# Patient Record
Sex: FEMALE
[De-identification: names, ages, dates, MRNs, and addresses within clinical notes are randomized per-mention and may not be internally consistent; named-entity substitution may affect disease eponyms.]

---

## 2017-05-05 PROBLEM — Z00.00 ENCOUNTER FOR PREVENTIVE HEALTH EXAMINATION: Status: ACTIVE | Noted: 2017-05-05

## 2017-06-02 ENCOUNTER — APPOINTMENT (OUTPATIENT)
Dept: CARDIOLOGY | Facility: CLINIC | Age: 67
End: 2017-06-02

## 2018-05-14 ENCOUNTER — RECORD ABSTRACTING (OUTPATIENT)
Age: 68
End: 2018-05-14

## 2018-05-16 ENCOUNTER — APPOINTMENT (OUTPATIENT)
Dept: CARDIOLOGY | Facility: CLINIC | Age: 68
End: 2018-05-16
Payer: COMMERCIAL

## 2018-05-16 ENCOUNTER — NON-APPOINTMENT (OUTPATIENT)
Age: 68
End: 2018-05-16

## 2018-05-16 VITALS
SYSTOLIC BLOOD PRESSURE: 118 MMHG | BODY MASS INDEX: 39.2 KG/M2 | HEART RATE: 96 BPM | DIASTOLIC BLOOD PRESSURE: 78 MMHG | HEIGHT: 62 IN | WEIGHT: 213 LBS

## 2018-05-16 DIAGNOSIS — Z87.898 PERSONAL HISTORY OF OTHER SPECIFIED CONDITIONS: ICD-10-CM

## 2018-05-16 DIAGNOSIS — Z86.59 PERSONAL HISTORY OF OTHER MENTAL AND BEHAVIORAL DISORDERS: ICD-10-CM

## 2018-05-16 DIAGNOSIS — Z82.49 FAMILY HISTORY OF ISCHEMIC HEART DISEASE AND OTHER DISEASES OF THE CIRCULATORY SYSTEM: ICD-10-CM

## 2018-05-16 PROCEDURE — 99214 OFFICE O/P EST MOD 30 MIN: CPT

## 2018-05-16 PROCEDURE — 93000 ELECTROCARDIOGRAM COMPLETE: CPT

## 2018-05-16 RX ORDER — CHROMIUM 200 MCG
TABLET ORAL
Refills: 0 | Status: ACTIVE | COMMUNITY

## 2018-05-17 PROBLEM — Z87.898 HISTORY OF EDEMA: Status: RESOLVED | Noted: 2018-05-14 | Resolved: 2018-05-17

## 2018-05-17 PROBLEM — Z86.59 HISTORY OF ANXIETY: Status: RESOLVED | Noted: 2018-05-14 | Resolved: 2018-05-17

## 2018-05-17 PROBLEM — Z82.49 FAMILY HISTORY OF MYOCARDIAL INFARCTION: Status: ACTIVE | Noted: 2018-05-14

## 2018-05-17 PROBLEM — Z82.49 FAMILY HISTORY OF HYPERTENSION: Status: ACTIVE | Noted: 2018-05-14

## 2018-05-31 ENCOUNTER — APPOINTMENT (OUTPATIENT)
Dept: CARDIOLOGY | Facility: CLINIC | Age: 68
End: 2018-05-31
Payer: COMMERCIAL

## 2018-05-31 PROCEDURE — 93306 TTE W/DOPPLER COMPLETE: CPT

## 2018-06-08 ENCOUNTER — APPOINTMENT (OUTPATIENT)
Dept: CARDIOLOGY | Facility: CLINIC | Age: 68
End: 2018-06-08

## 2018-07-11 ENCOUNTER — APPOINTMENT (OUTPATIENT)
Dept: CARDIOLOGY | Facility: CLINIC | Age: 68
End: 2018-07-11
Payer: COMMERCIAL

## 2018-07-11 PROCEDURE — 93015 CV STRESS TEST SUPVJ I&R: CPT

## 2018-07-11 PROCEDURE — A9502: CPT

## 2018-07-11 PROCEDURE — 78452 HT MUSCLE IMAGE SPECT MULT: CPT

## 2018-07-12 ENCOUNTER — APPOINTMENT (OUTPATIENT)
Dept: CARDIOLOGY | Facility: CLINIC | Age: 68
End: 2018-07-12

## 2018-07-12 ENCOUNTER — RESULT REVIEW (OUTPATIENT)
Age: 68
End: 2018-07-12

## 2018-07-13 ENCOUNTER — RESULT REVIEW (OUTPATIENT)
Age: 68
End: 2018-07-13

## 2019-05-15 ENCOUNTER — NON-APPOINTMENT (OUTPATIENT)
Age: 69
End: 2019-05-15

## 2019-05-15 ENCOUNTER — APPOINTMENT (OUTPATIENT)
Dept: CARDIOLOGY | Facility: CLINIC | Age: 69
End: 2019-05-15
Payer: COMMERCIAL

## 2019-05-15 VITALS
WEIGHT: 210 LBS | BODY MASS INDEX: 38.64 KG/M2 | OXYGEN SATURATION: 98 % | HEART RATE: 72 BPM | SYSTOLIC BLOOD PRESSURE: 126 MMHG | HEIGHT: 62 IN | DIASTOLIC BLOOD PRESSURE: 76 MMHG

## 2019-05-15 PROCEDURE — 93000 ELECTROCARDIOGRAM COMPLETE: CPT

## 2019-05-15 PROCEDURE — 99214 OFFICE O/P EST MOD 30 MIN: CPT

## 2019-05-15 RX ORDER — TRIAMTERENE AND HYDROCHLOROTHIAZIDE 25; 37.5 MG/1; MG/1
37.5-25 TABLET ORAL DAILY
Qty: 90 | Refills: 3 | Status: DISCONTINUED | COMMUNITY
Start: 2018-05-16 | End: 2019-05-15

## 2019-05-24 ENCOUNTER — APPOINTMENT (OUTPATIENT)
Dept: CARDIOLOGY | Facility: CLINIC | Age: 69
End: 2019-05-24
Payer: COMMERCIAL

## 2019-05-24 VITALS
DIASTOLIC BLOOD PRESSURE: 80 MMHG | WEIGHT: 210 LBS | HEART RATE: 84 BPM | BODY MASS INDEX: 38.64 KG/M2 | SYSTOLIC BLOOD PRESSURE: 132 MMHG | HEIGHT: 62 IN | OXYGEN SATURATION: 96 %

## 2019-05-24 PROCEDURE — 99214 OFFICE O/P EST MOD 30 MIN: CPT

## 2019-05-24 RX ORDER — OMEGA-3/DHA/EPA/FISH OIL 300-1000MG
CAPSULE ORAL
Refills: 0 | Status: ACTIVE | COMMUNITY

## 2019-11-27 ENCOUNTER — MEDICATION RENEWAL (OUTPATIENT)
Age: 69
End: 2019-11-27

## 2019-12-04 ENCOUNTER — APPOINTMENT (OUTPATIENT)
Dept: CARDIOLOGY | Facility: CLINIC | Age: 69
End: 2019-12-04
Payer: COMMERCIAL

## 2019-12-04 VITALS
SYSTOLIC BLOOD PRESSURE: 128 MMHG | DIASTOLIC BLOOD PRESSURE: 70 MMHG | HEIGHT: 62 IN | HEART RATE: 81 BPM | BODY MASS INDEX: 38.46 KG/M2 | WEIGHT: 209 LBS | OXYGEN SATURATION: 94 %

## 2019-12-04 PROCEDURE — 99214 OFFICE O/P EST MOD 30 MIN: CPT

## 2019-12-04 NOTE — PHYSICAL EXAM
[General Appearance - Well Developed] : well developed [Well Groomed] : well groomed [General Appearance - Well Nourished] : well nourished [Normal Appearance] : normal appearance [General Appearance - In No Acute Distress] : no acute distress [No Deformities] : no deformities [Eyelids - No Xanthelasma] : the eyelids demonstrated no xanthelasmas [Normal Oral Mucosa] : normal oral mucosa [Normal Conjunctiva] : the conjunctiva exhibited no abnormalities [No Oral Pallor] : no oral pallor [JVD Elevated _____cm] : JVD elevated [unfilled] ~U cm above clavicle [No Oral Cyanosis] : no oral cyanosis [Respiration, Rhythm And Depth] : normal respiratory rhythm and effort [Exaggerated Use Of Accessory Muscles For Inspiration] : no accessory muscle use [Auscultation Breath Sounds / Voice Sounds] : lungs were clear to auscultation bilaterally [Heart Rate And Rhythm] : heart rate and rhythm were normal [Arterial Pulses Normal] : the arterial pulses were normal [Heart Sounds] : normal S1 and S2 [Murmurs] : no murmurs present [Bowel Sounds] : normal bowel sounds [Abnormal Walk] : normal gait [Abdomen Soft] : soft [Nail Clubbing] : no clubbing of the fingernails [Cyanosis, Localized] : no localized cyanosis [Skin Color & Pigmentation] : normal skin color and pigmentation [] : no ischemic changes [Petechial Hemorrhages (___cm)] : no petechial hemorrhages [Skin Turgor] : normal skin turgor [No Xanthoma] : no  xanthoma was observed [Oriented To Time, Place, And Person] : oriented to person, place, and time [Mood] : the mood was normal [No Anxiety] : not feeling anxious [Affect] : the affect was normal [FreeTextEntry1] : Truncal obesity

## 2019-12-04 NOTE — ASSESSMENT
[FreeTextEntry1] : LABS AND/OR TEST:\par EKG  May 16, 2018,:  sinus rhythm, poor R-wave progression, cannot rule out old anterior wall myocardial infarction.\par \par Blood work, June 20, 2017, sodium of 144, potassium of 4.6, chloride of 107, CO2 of 27, glucose of 111, creatinine of 0.44 with BUN of 17, normal transaminases.  Triglycerides of 108, HDL of 59, LDL of 129, total cholesterol of 210, hemoglobin A1c of 5.7, normal TSH.\par \par EKG 05/15/19: Sinus rhythm. No significant changes\par \par

## 2019-12-04 NOTE — HISTORY OF PRESENT ILLNESS
[FreeTextEntry1] : HPI:\par Yolie is a pleasant 69-year-old female with:\par \par Obesity.  Unable to lose any weight; continues to have problems with physical activity secondary to arthritis.  She is quite stressed.  has dementia;  she sometimes gets headaches due to stress\par \par Edema.  She continues to use triamterene and hydrochlorothiazide 37.5/25 mg, maybe two days a week.\par \par Dyslipidemia.  Not following dietary restrictions. She also had mild fasting hyperglycemia in may last year\par \par Cardiovascular review of systems is negative for palpitations, presyncope, syncope, or angina.  She denies orthopnea, CNS events, or claudication at the level of exercise performed.\par

## 2019-12-04 NOTE — DISCUSSION/SUMMARY
[FreeTextEntry1] : ASSESSMENT AND RECOMMENDATIONS:\par A 69-year-old  female with past medical history as detailed above with the following active issues:\par \par Edema.  Multifactorial including intermittent use of nonsteroidals such as Celebrex, obesity, arthritis of both knees with resultant edema following her effusions, possible varicosities.  \par \par Echocardiogram Was done in May 2018. Borderline LVEF at 50%. Pulmonary hypertension was not reported.\par \par Obesity.  I have reviewed with her the importance of weight loss and improvement of her quality of life including her arthritis.\par \par I have given her a lab slip to check a CMP, A1c, fasting lipids. She will call for results\par \par \par \par Sincerely,\par Gael Rangel MD, FACC, [place]LE \par \par

## 2019-12-04 NOTE — PHYSICAL EXAM
[General Appearance - Well Developed] : well developed [Normal Appearance] : normal appearance [Well Groomed] : well groomed [General Appearance - Well Nourished] : well nourished [General Appearance - In No Acute Distress] : no acute distress [No Deformities] : no deformities [Normal Conjunctiva] : the conjunctiva exhibited no abnormalities [Normal Oral Mucosa] : normal oral mucosa [Eyelids - No Xanthelasma] : the eyelids demonstrated no xanthelasmas [No Oral Pallor] : no oral pallor [No Oral Cyanosis] : no oral cyanosis [JVD Elevated _____cm] : JVD elevated [unfilled] ~U cm above clavicle [Respiration, Rhythm And Depth] : normal respiratory rhythm and effort [Auscultation Breath Sounds / Voice Sounds] : lungs were clear to auscultation bilaterally [Exaggerated Use Of Accessory Muscles For Inspiration] : no accessory muscle use [Heart Rate And Rhythm] : heart rate and rhythm were normal [Heart Sounds] : normal S1 and S2 [Murmurs] : no murmurs present [Arterial Pulses Normal] : the arterial pulses were normal [Bowel Sounds] : normal bowel sounds [Abdomen Soft] : soft [Abnormal Walk] : normal gait [Nail Clubbing] : no clubbing of the fingernails [Petechial Hemorrhages (___cm)] : no petechial hemorrhages [Cyanosis, Localized] : no localized cyanosis [] : no ischemic changes [Skin Color & Pigmentation] : normal skin color and pigmentation [Skin Turgor] : normal skin turgor [No Xanthoma] : no  xanthoma was observed [Oriented To Time, Place, And Person] : oriented to person, place, and time [Affect] : the affect was normal [No Anxiety] : not feeling anxious [Mood] : the mood was normal [FreeTextEntry1] : No kyphosis

## 2019-12-04 NOTE — DISCUSSION/SUMMARY
[FreeTextEntry1] : ASSESSMENT AND RECOMMENDATIONS:\par A 69-year-old  female with past medical history as detailed above with the following active issues:\par \par Edema.  Multifactorial including intermittent use of nonsteroidals such as Celebrex, obesity, arthritis of both knees with resultant edema following her effusions, possible varicosities.  \par \par Echocardiogram Was done in May 2018. Borderline LVEF at 50%. Pulmonary hypertension was not reported.\par \par Obesity.  I have reviewed with her the importance of weight loss and improvement of her quality of life including her arthritis.\par \par Labs showed mild hyperglycemia, normal creatinine and LFT. LDL of 124. He was A1C 5.7\par \par Dietary changes, exercise program was discussed in detail. 10 yr calculated risk is greater than 10%. Start statins. Check labs after one month.\par \par \par \par Sincerely,\par Gael Rangel MD, FACC, [place]LE \par \par

## 2019-12-04 NOTE — HISTORY OF PRESENT ILLNESS
[FreeTextEntry1] : HPI:\par I had the pleasure of seeing Ms. Whelan for her annual cardiovascular follow up.  Since she was last seen, there have been no hospitalizations.\par \par Obesity.  Unable to lose any weight as she is unable to control her appetite and continues to have problems with physical activity secondary to arthritis.  She is quite stressed.  has dementia;  she sometimes gets headaches due to stress\par \par Edema.  She continues to use triamterene and hydrochlorothiazide 37.5/25 mg, maybe two days a week.\par \par Dyslipidemia.  Not following dietary restrictions. She also had mild fasting hyperglycemia in may last year\par \par Cardiovascular review of systems is negative for palpitations, presyncope, syncope, or angina.  She denies orthopnea, CNS events, or claudication at the level of exercise performed.\par

## 2019-12-04 NOTE — PHYSICAL EXAM
[General Appearance - Well Developed] : well developed [Normal Appearance] : normal appearance [Well Groomed] : well groomed [General Appearance - Well Nourished] : well nourished [No Deformities] : no deformities [General Appearance - In No Acute Distress] : no acute distress [Normal Conjunctiva] : the conjunctiva exhibited no abnormalities [Eyelids - No Xanthelasma] : the eyelids demonstrated no xanthelasmas [Normal Oral Mucosa] : normal oral mucosa [No Oral Pallor] : no oral pallor [No Oral Cyanosis] : no oral cyanosis [JVD Elevated _____cm] : JVD elevated [unfilled] ~U cm above clavicle [Respiration, Rhythm And Depth] : normal respiratory rhythm and effort [Exaggerated Use Of Accessory Muscles For Inspiration] : no accessory muscle use [Auscultation Breath Sounds / Voice Sounds] : lungs were clear to auscultation bilaterally [Heart Rate And Rhythm] : heart rate and rhythm were normal [Heart Sounds] : normal S1 and S2 [Murmurs] : no murmurs present [Arterial Pulses Normal] : the arterial pulses were normal [Bowel Sounds] : normal bowel sounds [Abdomen Soft] : soft [Abnormal Walk] : normal gait [Nail Clubbing] : no clubbing of the fingernails [Cyanosis, Localized] : no localized cyanosis [Petechial Hemorrhages (___cm)] : no petechial hemorrhages [] : no ischemic changes [Skin Turgor] : normal skin turgor [Skin Color & Pigmentation] : normal skin color and pigmentation [No Xanthoma] : no  xanthoma was observed [Affect] : the affect was normal [Oriented To Time, Place, And Person] : oriented to person, place, and time [No Anxiety] : not feeling anxious [Mood] : the mood was normal [FreeTextEntry1] : No kyphosis

## 2019-12-04 NOTE — DISCUSSION/SUMMARY
[FreeTextEntry1] : ASSESSMENT AND RECOMMENDATIONS:\par A 69-year-old  female with past medical history as detailed above with the following active issues:\par \par Edema.  Multifactorial including intermittent use of nonsteroidals such as Celebrex, obesity, arthritis of both knees with resultant edema following her effusions, possible varicosities.  \par \par Echocardiogram Was done in May 2018. Borderline LVEF at 50%. Pulmonary hypertension was not reported.\par \par Obesity.  I have reviewed with her the importance of weight loss and improvement of her quality of life including her arthritis.\par \par Labs showed mild hyperglycemia, normal creatinine and LFT. LDL of 124. He was A1C 5.7\par \par Dietary changes, exercise program was discussed in detail. 10 yr calculated risk is greater than 10%. Start statins.  She Lost her lab slip.  Repeat labs were given to her.\par \par Patient had abnormal echocardiogram in 2018 with inferior wall hypokinesis.  Subsequent nuclear stress test showed normal SPECT perfusion imaging in July 2018\par \par Sincerely,\par Gael Rangel MD, FACC, [place]HELLE \par \par

## 2021-12-25 ENCOUNTER — TRANSCRIPTION ENCOUNTER (OUTPATIENT)
Age: 71
End: 2021-12-25

## 2022-03-16 ENCOUNTER — RESULT CHARGE (OUTPATIENT)
Age: 72
End: 2022-03-16

## 2022-03-17 ENCOUNTER — APPOINTMENT (OUTPATIENT)
Dept: CARDIOLOGY | Facility: CLINIC | Age: 72
End: 2022-03-17
Payer: COMMERCIAL

## 2022-03-17 ENCOUNTER — NON-APPOINTMENT (OUTPATIENT)
Age: 72
End: 2022-03-17

## 2022-03-17 VITALS
OXYGEN SATURATION: 95 % | HEIGHT: 62 IN | BODY MASS INDEX: 39.56 KG/M2 | DIASTOLIC BLOOD PRESSURE: 78 MMHG | SYSTOLIC BLOOD PRESSURE: 118 MMHG | HEART RATE: 86 BPM | WEIGHT: 215 LBS | TEMPERATURE: 97.3 F

## 2022-03-17 DIAGNOSIS — Z86.79 PERSONAL HISTORY OF OTHER DISEASES OF THE CIRCULATORY SYSTEM: ICD-10-CM

## 2022-03-17 PROCEDURE — 99214 OFFICE O/P EST MOD 30 MIN: CPT

## 2022-03-17 RX ORDER — ATORVASTATIN CALCIUM 20 MG/1
20 TABLET, FILM COATED ORAL
Qty: 90 | Refills: 3 | Status: DISCONTINUED | COMMUNITY
Start: 2019-05-24 | End: 2022-03-17

## 2022-03-17 RX ORDER — COLD-HOT PACK
EACH MISCELLANEOUS
Refills: 0 | Status: ACTIVE | COMMUNITY

## 2022-03-17 RX ORDER — BENZONATATE 200 MG/1
200 CAPSULE ORAL
Qty: 30 | Refills: 0 | Status: DISCONTINUED | COMMUNITY
Start: 2019-11-18 | End: 2022-03-17

## 2022-03-17 NOTE — PHYSICAL EXAM
[General Appearance - Well Developed] : well developed [Normal Appearance] : normal appearance [Well Groomed] : well groomed [General Appearance - Well Nourished] : well nourished [No Deformities] : no deformities [General Appearance - In No Acute Distress] : no acute distress [Normal Conjunctiva] : the conjunctiva exhibited no abnormalities [Eyelids - No Xanthelasma] : the eyelids demonstrated no xanthelasmas [Normal Oral Mucosa] : normal oral mucosa [No Oral Pallor] : no oral pallor [No Oral Cyanosis] : no oral cyanosis [JVD Elevated _____cm] : JVD elevated [unfilled] ~U cm above clavicle [Respiration, Rhythm And Depth] : normal respiratory rhythm and effort [Exaggerated Use Of Accessory Muscles For Inspiration] : no accessory muscle use [Auscultation Breath Sounds / Voice Sounds] : lungs were clear to auscultation bilaterally [Heart Rate And Rhythm] : heart rate and rhythm were normal [Heart Sounds] : normal S1 and S2 [Murmurs] : no murmurs present [Arterial Pulses Normal] : the arterial pulses were normal [Bowel Sounds] : normal bowel sounds [Abdomen Soft] : soft [Abnormal Walk] : normal gait [Nail Clubbing] : no clubbing of the fingernails [Cyanosis, Localized] : no localized cyanosis [Petechial Hemorrhages (___cm)] : no petechial hemorrhages [] : no ischemic changes [Skin Color & Pigmentation] : normal skin color and pigmentation [Skin Turgor] : normal skin turgor [No Xanthoma] : no  xanthoma was observed [Oriented To Time, Place, And Person] : oriented to person, place, and time [Affect] : the affect was normal [Mood] : the mood was normal [No Anxiety] : not feeling anxious [FreeTextEntry1] : No kyphosis

## 2022-03-17 NOTE — HISTORY OF PRESENT ILLNESS
[FreeTextEntry1] : HPI:\par Yolie is a pleasant 72-year-old female with:\par \par Obesity.  Unable to lose any weight; continues to have problems with physical activity secondary to arthritis.  She is quite stressed.  has dementia;  \par \par Edema.  She continues to use triamterene and hydrochlorothiazide 37.5/25 mg, maybe two days a week.\par \par Dyslipidemia.  Not following dietary restrictions. She also had fasting hyperglycemia in December last year\par \par Cardiovascular review of systems is negative for palpitations, presyncope, syncope, or angina.  She denies orthopnea, CNS events, or claudication at the level of exercise performed.\par \par Status post breast surgery for cancer in 2021.  Subsequent radiation treatment and currently on suppressive therapy p.o.\par

## 2022-03-17 NOTE — ASSESSMENT
[FreeTextEntry1] : LABS AND/OR TEST:\par EKG  May 16, 2018,:  sinus rhythm, poor R-wave progression, cannot rule out old anterior wall myocardial infarction.\par \par Blood work, June 20, 2017, sodium of 144, potassium of 4.6, chloride of 107, CO2 of 27, glucose of 111, creatinine of 0.44 with BUN of 17, normal transaminases.  Triglycerides of 108, HDL of 59, LDL of 129, total cholesterol of 210, hemoglobin A1c of 5.7, normal TSH.\par \par EKG 05/15/19: Sinus rhythm. No significant changes\par \par EKG 3/2022: NSR, wnl\par \par EKG 3/17/2022: Sinus rhythm, unremarkable\par \par Labs December 2021: Normal CBC except neutrophil percentage 79.9.  Potassium 3.2.  .  Normal LFT\par \par

## 2022-03-17 NOTE — DISCUSSION/SUMMARY
[FreeTextEntry1] : ASSESSMENT AND RECOMMENDATIONS:\par A 72-year-old  female with past medical history as detailed above with the following active issues:\par \par Edema.  Multifactorial including intermittent use of nonsteroidals such as Celebrex, obesity, arthritis of both knees and ankles and possible varicosities.  Mostly subcutaneous fat however.\par \par Echocardiogram Was done in May 2018. Borderline LVEF at 50%. Pulmonary hypertension was not reported.  She does have dyspnea on exertion.  This could be multifactorial but echo should be repeated.\par \par Unequal carotid upstrokes and risk factors for atherosclerosis: Check carotid ultrasound.\par \par Obesity.  I have reviewed with her the importance of weight loss and improvement of her quality of life including her arthritis.\par \par December labs showed  hyperglycemia, normal creatinine and low potassium.  Also normal LFT. LDL of 124 in the past. He was A1C 5.7; check BMP, A1c, fasting lipids.  She most likely will be be a candidate of statins.\par \par Dietary changes, exercise program was discussed in detail. 10 yr calculated risk was greater than 10% and she was started on statins but she has discontinued them.  She does not remember.\par \par Patient had abnormal echocardiogram in 2018 with inferior wall hypokinesis.  Subsequent nuclear stress test showed normal SPECT perfusion imaging in July 2018\par \par Sincerely,\par Gael Rangel MD, FACC, [place] \par \par

## 2022-04-13 ENCOUNTER — APPOINTMENT (OUTPATIENT)
Dept: CARDIOLOGY | Facility: CLINIC | Age: 72
End: 2022-04-13
Payer: COMMERCIAL

## 2022-04-13 PROCEDURE — 93880 EXTRACRANIAL BILAT STUDY: CPT

## 2022-04-13 PROCEDURE — 93306 TTE W/DOPPLER COMPLETE: CPT

## 2022-04-14 ENCOUNTER — APPOINTMENT (OUTPATIENT)
Dept: CARDIOLOGY | Facility: CLINIC | Age: 72
End: 2022-04-14
Payer: COMMERCIAL

## 2022-04-14 VITALS
BODY MASS INDEX: 39.56 KG/M2 | OXYGEN SATURATION: 93 % | HEIGHT: 62 IN | HEART RATE: 78 BPM | DIASTOLIC BLOOD PRESSURE: 64 MMHG | TEMPERATURE: 97.3 F | SYSTOLIC BLOOD PRESSURE: 120 MMHG | WEIGHT: 215 LBS

## 2022-04-14 PROCEDURE — 99214 OFFICE O/P EST MOD 30 MIN: CPT

## 2022-04-14 NOTE — ASSESSMENT
[FreeTextEntry1] : LABS AND/OR TEST:\par EKG  May 16, 2018,:  sinus rhythm, poor R-wave progression, cannot rule out old anterior wall myocardial infarction.\par \par Blood work, June 20, 2017, sodium of 144, potassium of 4.6, chloride of 107, CO2 of 27, glucose of 111, creatinine of 0.44 with BUN of 17, normal transaminases.  Triglycerides of 108, HDL of 59, LDL of 129, total cholesterol of 210, hemoglobin A1c of 5.7, normal TSH.\par \par EKG 05/15/19: Sinus rhythm. No significant changes\par \par EKG 3/2022: NSR, wnl\par \par EKG 3/17/2022: Sinus rhythm, unremarkable\par \par Labs December 2021: Normal CBC except neutrophil percentage 79.9.  Potassium 3.2.  .  Normal LFT\par \par Carotid ultrasound April 2022: No significant stenosis.  Thyroid nodule, incidental finding noted.\par \par Labs April 22: See above\par \par Echocardiogram April 2022: Borderline LVEF.  Unchanged compared to previous echocardiograms.  Mild LVH.  Normal PASP\par \par

## 2022-04-14 NOTE — DISCUSSION/SUMMARY
[FreeTextEntry1] : ASSESSMENT AND RECOMMENDATIONS:\par A 72-year-old  female with following active issues:\par \par Mild lower extremity edema.  Multifactorial including intermittent use of nonsteroidals such as Celebrex, obesity, arthritis of both knees and ankles and possible varicosities.  Mostly subcutaneous fat however.\par \par Echocardiogram Was done in May 2018. Borderline LVEF at 50%. Pulmonary hypertension was not reported.  She does have dyspnea on exertion.  This could be multifactorial but echo was repeated.  EF remains borderline.  Start low-dose metoprolol.  If the blood pressure allows, we will add ARB.  Rule out CAD -noninvasive Lexiscan stress test was discussed.  Patient agreeable.  Risk, benefits and limitations discussed.\par \par Carotid ultrasound does not show any significant stenosis .  Incidental thyroid nodule.  I have given the patient a copy of the report which she will take to Dr. Metzger for follow-up thyroid ultrasound and possible biopsy\par \par Obesity.  I have reviewed with her the importance of weight loss and improvement of her quality of life including her arthritis.\par \par December labs showed  hyperglycemia, normal creatinine and low potassium.  Also normal LFT. LDL of 124 in the past.  A1C 5.7; start statins.  She used to take Lipitor 10 mg daily.\par \par Dietary changes, exercise program was discussed in detail. 10 yr calculated risk was greater than 10% and she was started on statins but she has discontinued them.  She does not remember.\par \par Patient had abnormal echocardiogram in 2018 as well as 2022.  Baseline EKG has bifascicular block.  She has occasional epigastric discomfort several risk factors including hyperglycemia.  Further evaluation with Lexiscan stress test should be performed.\par \par Follow-up after stress test\par \par \par Sincerely,\par Gael Rangel MD, FACC, [place] \par \par

## 2022-04-14 NOTE — HISTORY OF PRESENT ILLNESS
[FreeTextEntry1] : HPI:\par Yolie is a pleasant 72-year-old female with:\par \par Obesity.  Unable to lose any weight; continues to have problems with physical activity secondary to arthritis.  She is quite stressed.  has dementia;  \par \par Edema.  It is quite slight.  She continues to use triamterene and hydrochlorothiazide 37.5/25 mg, maybe two days a week.\par \par Dyslipidemia.  Not following dietary restrictions. She also had fasting hyperglycemia in December last year.  She used to take statins which she has now stopped.  Her LDL is 125.  Total cholesterol 210 on labs April 2022\par \par Mild fasting hyperglycemia.   on labs in April 2022\par \par Cardiovascular review of systems is negative for palpitations, presyncope, syncope, or angina.  She denies orthopnea, CNS events, or claudication at the level of exercise performed.\par \par Status post breast surgery for cancer in 2021.  Subsequent radiation treatment and currently on suppressive therapy p.o.\par

## 2022-04-26 ENCOUNTER — APPOINTMENT (OUTPATIENT)
Dept: CARDIOLOGY | Facility: CLINIC | Age: 72
End: 2022-04-26
Payer: COMMERCIAL

## 2022-04-26 PROCEDURE — 93015 CV STRESS TEST SUPVJ I&R: CPT

## 2022-04-26 PROCEDURE — 78452 HT MUSCLE IMAGE SPECT MULT: CPT

## 2022-04-26 PROCEDURE — A9502: CPT

## 2022-05-12 ENCOUNTER — APPOINTMENT (OUTPATIENT)
Dept: CARDIOLOGY | Facility: CLINIC | Age: 72
End: 2022-05-12
Payer: COMMERCIAL

## 2022-05-12 VITALS
TEMPERATURE: 97.8 F | DIASTOLIC BLOOD PRESSURE: 76 MMHG | BODY MASS INDEX: 39.93 KG/M2 | SYSTOLIC BLOOD PRESSURE: 134 MMHG | OXYGEN SATURATION: 97 % | HEART RATE: 55 BPM | WEIGHT: 217 LBS | RESPIRATION RATE: 14 BRPM | HEIGHT: 62 IN

## 2022-05-12 DIAGNOSIS — R60.0 LOCALIZED EDEMA: ICD-10-CM

## 2022-05-12 PROCEDURE — 99214 OFFICE O/P EST MOD 30 MIN: CPT

## 2022-05-12 NOTE — ASSESSMENT
[FreeTextEntry1] : LABS AND/OR TEST:\par EKG  May 16, 2018,:  sinus rhythm, poor R-wave progression, cannot rule out old anterior wall myocardial infarction.\par \par EKG 3/2022: NSR, wnl\par \par EKG 3/17/2022: Sinus rhythm, unremarkable\par \par Labs December 2021: Normal CBC except neutrophil percentage 79.9.  Potassium 3.2.  .  Normal LFT\par \par Carotid ultrasound April 2022: No significant stenosis.  Thyroid nodule, incidental finding noted.\par \par Labs April 22: See above\par \par Echocardiogram April 2022: Borderline LVEF.  Unchanged compared to previous echocardiograms.  Mild LVH.  Normal PASP\par \par Lexiscan stress test with SPECT April 2022: Small anterior scar versus attenuation.  No ischemia.\par \par

## 2022-05-12 NOTE — DISCUSSION/SUMMARY
[FreeTextEntry1] : ASSESSMENT AND RECOMMENDATIONS:\par A 72-year-old  female with following active issues:\par \par Mild lower extremity edema.  Multifactorial including intermittent use of non-steroidals such as Celebrex, obesity, arthritis of both knees and ankles and possible varicosities.  Mostly subcutaneous fat however.  And some varicose veins\par \par Echocardiogram Was done in May 2018. Borderline LVEF at 50%. Pulmonary hypertension was not reported.  She does have dyspnea on exertion.  This could be multifactorial but echo was repeated in April 2022.  EF remains borderline.  \par \par Patient had nuclear stress test in April 2022 which showed fixed defect, small and mild probably attenuation from breast.  No ischemia.\par \par Hypertension: Started low-dose metoprolol.  Her pressure improved.  At her blood pressure less than 130/80.  Patient understands this.  Nonpharmacologic ways of reducing blood pressure discussed.\par \par Carotid ultrasound does not show any significant stenosis .  Incidental thyroid nodule.  I have given the patient a copy of the report which she will take to Dr. Metzger for follow-up thyroid ultrasound and possible biopsy\par \par Obesity.  I have reviewed with her the importance of weight loss and improvement of her quality of life including her arthritis.\par \par December 2021 labs showed  hyperglycemia, normal creatinine and low potassium.  Also normal LFT. LDL of 124 in the past.  A1C 5.7; start statins.  She used to take Lipitor 10 mg daily.\par \par Dietary changes, exercise program was discussed in detail. 10 yr calculated risk was greater than 10% and she was started on statins but she has discontinued them.  She is agreeable to resume them.  They have been prescribed to the pharmacy.\par \par Follow-up in 6 months\par \par \par Sincerely,\par Gael Rangel MD, FACC, [place]HELLE \par \par

## 2022-12-08 ENCOUNTER — APPOINTMENT (OUTPATIENT)
Dept: CARDIOLOGY | Facility: CLINIC | Age: 72
End: 2022-12-08

## 2022-12-15 ENCOUNTER — NON-APPOINTMENT (OUTPATIENT)
Age: 72
End: 2022-12-15

## 2022-12-15 ENCOUNTER — APPOINTMENT (OUTPATIENT)
Dept: CARDIOLOGY | Facility: CLINIC | Age: 72
End: 2022-12-15

## 2022-12-15 VITALS
DIASTOLIC BLOOD PRESSURE: 72 MMHG | HEIGHT: 62 IN | HEART RATE: 77 BPM | SYSTOLIC BLOOD PRESSURE: 150 MMHG | BODY MASS INDEX: 39.93 KG/M2 | WEIGHT: 217 LBS | TEMPERATURE: 97.7 F | OXYGEN SATURATION: 96 %

## 2022-12-15 PROCEDURE — 99214 OFFICE O/P EST MOD 30 MIN: CPT

## 2022-12-15 RX ORDER — METOPROLOL SUCCINATE 25 MG/1
25 TABLET, EXTENDED RELEASE ORAL
Qty: 90 | Refills: 1 | Status: DISCONTINUED | COMMUNITY
Start: 2022-04-14 | End: 2022-12-15

## 2022-12-15 NOTE — DISCUSSION/SUMMARY
[FreeTextEntry1] : ASSESSMENT AND RECOMMENDATIONS:\par A 72-year-old  female with following active issues:\par \par Mild lower extremity edema.  Multifactorial including intermittent use of non-steroidals such as Celebrex, obesity, arthritis of both knees and ankles and possible varicosities.  Mostly subcutaneous fat however.  And some varicose veins\par \par Echocardiogram Was done in May 2018. Borderline LVEF at 50%. Pulmonary hypertension was not reported.  She does have dyspnea on exertion.  Echo was repeated in April 2022.  EF remains borderline.  \par \par Patient had nuclear stress test in April 2022 which showed fixed defect, small and mild probably attenuation from breast.  No ischemia.\par \par Hypertension: She was started low-dose metoprolol.  Her pressure improved.  She has stopped taking metoprolol on her own.  Pressure is elevated today.  Nonpharmacologic ways of reducing blood pressure discussed.  She feels that her pressures are well controlled at other physician offices and at home.\par \par Carotid ultrasound does not show any significant stenosis .  Incidental thyroid nodule.  I have minded her regarding follow-up for her thyroid nodule\par \par Obesity.  I have reviewed with her the importance of weight loss and improvement of her quality of life including her arthritis, improvement in blood pressure control and lipid control and sugar control.  She wants to try Wegovy..\par \par December 2021 labs showed  hyperglycemia, normal creatinine and low potassium.  Also normal LFT. LDL of 124 in the past.  A1C 5.7; start statins.  She used to take Lipitor 10 mg daily.\par \par Follow-up in 2 weeks after starting Wegovy \par \par \par Sincerely,\par Gael Rangel MD, FACC, ROCHELLE \par \par

## 2022-12-15 NOTE — HISTORY OF PRESENT ILLNESS
[FreeTextEntry1] : HPI:\par Yolie is a pleasant 72-year-old female with:\par \par Obesity.  Unable to lose any weight; continues to have problems with physical activity secondary to arthritis.  She is quite stressed.  has dementia; obesity is affecting her blood pressure, lipid profile, hyperglycemia lifestyle and ambulation (joint pains with arthritis)\par \par Edema.  It is quite slight.  She continues to use triamterene and hydrochlorothiazide 37.5/25 mg, maybe two days a week.\par \par Dyslipidemia.  She also had fasting hyperglycemia in December last year.  She used to take statins which she has now stopped.  Her LDL was 125 in April 2022.  Total cholesterol 210 \par \par Mild fasting hyperglycemia.   on labs in April 2022\par \par Cardiovascular review of systems is negative for palpitations, presyncope, syncope, or angina.  She denies orthopnea, CNS events, or claudication at the level of exercise performed.\par \par Status post breast surgery for cancer in 2021.  Subsequent radiation treatment and currently on suppressive therapy p.o.\par \par Hypertension.  She is not taking metoprolol.  Asymptomatic for it.\par

## 2022-12-15 NOTE — PHYSICAL EXAM
[General Appearance - Well Developed] : well developed [Normal Appearance] : normal appearance [Well Groomed] : well groomed [General Appearance - Well Nourished] : well nourished [No Deformities] : no deformities [General Appearance - In No Acute Distress] : no acute distress [Normal Conjunctiva] : the conjunctiva exhibited no abnormalities [Eyelids - No Xanthelasma] : the eyelids demonstrated no xanthelasmas [Normal Oral Mucosa] : normal oral mucosa [No Oral Pallor] : no oral pallor [No Oral Cyanosis] : no oral cyanosis [JVD Elevated _____cm] : JVD elevated [unfilled] ~U cm above clavicle [Respiration, Rhythm And Depth] : normal respiratory rhythm and effort [Exaggerated Use Of Accessory Muscles For Inspiration] : no accessory muscle use [Auscultation Breath Sounds / Voice Sounds] : lungs were clear to auscultation bilaterally [Heart Rate And Rhythm] : heart rate and rhythm were normal [Heart Sounds] : normal S1 and S2 [Murmurs] : no murmurs present [Arterial Pulses Normal] : the arterial pulses were normal [Bowel Sounds] : normal bowel sounds [Abdomen Soft] : soft [Abnormal Walk] : normal gait [FreeTextEntry1] : No kyphosis [Nail Clubbing] : no clubbing of the fingernails [Cyanosis, Localized] : no localized cyanosis [Petechial Hemorrhages (___cm)] : no petechial hemorrhages [] : no ischemic changes [Skin Color & Pigmentation] : normal skin color and pigmentation [Skin Turgor] : normal skin turgor [No Xanthoma] : no  xanthoma was observed [Oriented To Time, Place, And Person] : oriented to person, place, and time [Affect] : the affect was normal [Mood] : the mood was normal [No Anxiety] : not feeling anxious

## 2022-12-16 ENCOUNTER — NON-APPOINTMENT (OUTPATIENT)
Age: 72
End: 2022-12-16

## 2023-01-05 ENCOUNTER — APPOINTMENT (OUTPATIENT)
Dept: CARDIOLOGY | Facility: CLINIC | Age: 73
End: 2023-01-05

## 2023-03-27 ENCOUNTER — APPOINTMENT (OUTPATIENT)
Dept: CARDIOLOGY | Facility: CLINIC | Age: 73
End: 2023-03-27
Payer: COMMERCIAL

## 2023-03-27 ENCOUNTER — NON-APPOINTMENT (OUTPATIENT)
Age: 73
End: 2023-03-27

## 2023-03-27 VITALS
BODY MASS INDEX: 39.93 KG/M2 | WEIGHT: 217 LBS | RESPIRATION RATE: 14 BRPM | HEART RATE: 90 BPM | SYSTOLIC BLOOD PRESSURE: 130 MMHG | DIASTOLIC BLOOD PRESSURE: 76 MMHG | TEMPERATURE: 97.6 F | OXYGEN SATURATION: 97 % | HEIGHT: 62 IN

## 2023-03-27 PROCEDURE — 93000 ELECTROCARDIOGRAM COMPLETE: CPT

## 2023-03-27 PROCEDURE — 99214 OFFICE O/P EST MOD 30 MIN: CPT | Mod: 25

## 2023-03-27 NOTE — DISCUSSION/SUMMARY
[FreeTextEntry1] : ASSESSMENT AND RECOMMENDATIONS:\par A 73-year-old  female with following active issues:\par \par Mild lower extremity edema.  Multifactorial including intermittent use of non-steroidals such as Celebrex, obesity, arthritis of both knees and ankles and possible varicosities.  Mostly subcutaneous fat however.  And some varicose veins\par \par Echocardiogram Was done in May 2018. Borderline LVEF at 50%. Pulmonary hypertension was not reported.  She does have dyspnea on exertion.  Echo was repeated in April 2022.  EF remains borderline.  \par \par Patient had nuclear stress test in April 2022 which showed fixed defect, small and mild probably attenuation from breast.  No ischemia.\par \par Hypertension: She was started low-dose metoprolol.  Her pressure improved.   Nonpharmacologic ways of reducing blood pressure discussed.  She feels that her pressures are well controlled at other physician offices and at home.\par \par Carotid ultrasound does not show any significant stenosis .  Incidental thyroid nodule.  I have minded her regarding follow-up for her thyroid nodule\par \par Obesity.  I have reviewed with her the importance of weight loss and improvement of her quality of life including her arthritis, improvement in blood pressure control and lipid control and sugar control.  She is currently on Wegovy without much benefit.  The dose will be increased.  Reviewed possible side effects.  Dietary changes with reduction of carbs and increase exercise also discussed.  Exercise limited by sciatica.\par \par December 2021 labs showed  hyperglycemia, normal creatinine and low potassium.  Also normal LFT. LDL of 124 in the past.  A1C 5.7; start statins.  She used to take Lipitor 10 mg daily.\par \par \par Sincerely,\par Gael Rangel MD, FACC, [place] \par \par

## 2023-03-27 NOTE — HISTORY OF PRESENT ILLNESS
[FreeTextEntry1] : HPI:\par Yolie is a pleasant 72-year-old female with:\par \par Obesity.  She was unable to lose any weight; continues to have problems with physical activity secondary to arthritis. obesity was affecting her blood pressure, lipid profile, hyperglycemia lifestyle and ambulation (joint pains with arthritis).  On Wegovy.  He has not lost much weight.\par \par Edema.  It is quite slight.  She continues to use triamterene and hydrochlorothiazide 37.5/25 mg, maybe two days a week.\par \par Dyslipidemia.  She also had fasting hyperglycemia in December last year.  She used to take statins which she has now stopped.  Her LDL was 125 in April 2022.  Total cholesterol 210 \par \par Mild fasting hyperglycemia.   on labs in April 2022\par \par Cardiovascular review of systems is negative for palpitations, presyncope, syncope, or angina.  She denies orthopnea, CNS events, or claudication at the level of exercise performed.\par \par Status post breast surgery for cancer in 2021.  Subsequent radiation treatment and currently on suppressive therapy p.o.\par \par Hypertension.  She is not taking metoprolol.  Asymptomatic for it.\par

## 2023-05-25 ENCOUNTER — APPOINTMENT (OUTPATIENT)
Dept: CARDIOLOGY | Facility: CLINIC | Age: 73
End: 2023-05-25
Payer: COMMERCIAL

## 2023-05-25 VITALS
HEART RATE: 81 BPM | SYSTOLIC BLOOD PRESSURE: 122 MMHG | WEIGHT: 219 LBS | HEIGHT: 62 IN | BODY MASS INDEX: 40.3 KG/M2 | OXYGEN SATURATION: 97 % | DIASTOLIC BLOOD PRESSURE: 68 MMHG

## 2023-05-25 DIAGNOSIS — R52 PAIN, UNSPECIFIED: ICD-10-CM

## 2023-05-25 DIAGNOSIS — C50.919 MALIGNANT NEOPLASM OF UNSPECIFIED SITE OF UNSPECIFIED FEMALE BREAST: ICD-10-CM

## 2023-05-25 PROCEDURE — 99214 OFFICE O/P EST MOD 30 MIN: CPT

## 2023-05-25 RX ORDER — ATORVASTATIN CALCIUM 10 MG/1
10 TABLET, FILM COATED ORAL
Qty: 90 | Refills: 1 | Status: ACTIVE | COMMUNITY
Start: 2022-04-14 | End: 1900-01-01

## 2023-05-25 NOTE — HISTORY OF PRESENT ILLNESS
[FreeTextEntry1] : HPI:\dat Urbano is a pleasant 73-year-old female with:\par \par Obesity.  She is unable to lose any weight; continues to have problems with physical activity secondary to arthritis. obesity was affecting her blood pressure, lipid profile, hyperglycemia lifestyle and ambulation (joint pains with arthritis).  On Wegovy.  She feels that her clothes are loose compared to before\par \par Edema.  It is minimal.  Mostly subcutaneous fat.  She is no longer on diuretics.\par \par Dyslipidemia.  She also had fasting hyperglycemia.  She has resumed statins.\par \par Mild fasting hyperglycemia.   on labs in April 2022.  This has improved on most recent labs with FBS of 86\par \par Cardiovascular review of systems is negative for palpitations, presyncope, syncope, or angina.  She denies orthopnea, CNS events, or claudication at the level of exercise performed.\par \par Status post breast surgery for cancer in 2021.  Subsequent radiation treatment and currently on suppressive therapy p.o.\par \par Hypertension.  She is not taking metoprolol.  Asymptomatic for it.\par

## 2023-05-25 NOTE — DISCUSSION/SUMMARY
[FreeTextEntry1] : ASSESSMENT AND RECOMMENDATIONS:\par A 73-year-old  female with following active issues:\par \par Mild lower extremity edema.  Multifactorial including intermittent use of non-steroidals such as Celebrex, obesity, arthritis of both knees and ankles and possible varicosities.  Mostly subcutaneous fat however.  And some varicose veins\par \par Echocardiogram Was done in May 2018. Borderline LVEF at 50%. Pulmonary hypertension was not reported.  She does have dyspnea on exertion.  Echo was repeated in April 2022.  EF remains borderline.  Repeat LVEF in 6 months.\par \par Patient had nuclear stress test in April 2022 which showed fixed defect, small and mild probably attenuation from breast.  No ischemia.\par \par Hypertension:.   Nonpharmacologic ways of reducing blood pressure discussed.  She feels that her pressures are well controlled at other physician offices and at home.  Currently she is not taking metoprolol.  Blood pressure remains controlled.\par \par Obesity.  I have reviewed with her the importance of weight loss and improvement of her quality of life including her arthritis, improvement in blood pressure control and lipid control and sugar control.  She is currently on Wegovy without much benefit.  The dose will be increased.  Reviewed possible side effects.  Dietary changes with reduction of carbs and increase exercise also discussed.  Exercise limited by sciatica.\par \par March 2023 Labs showed normal FBS.  Normal LFT.   LDL of 124 in the past.  On  Lipitor 10 mg daily.  Check fasting lipids and CPK.\par \par \par Sincerely,\par Gael Rangel MD, FACC, [place] \par \par

## 2023-05-25 NOTE — ASSESSMENT
[FreeTextEntry1] : LABS AND/OR TEST:\par \par Labs December 2021: Normal CBC except neutrophil percentage 79.9.  Potassium 3.2.  .  Normal LFT\par \par Carotid ultrasound April 2022: No significant stenosis.  Thyroid nodule, incidental finding noted.\par \par Labs April 22: See above\par \par Echocardiogram April 2022: Borderline LVEF.  Unchanged compared to previous echocardiograms.  Mild LVH.  Normal PASP\par \par Lexiscan stress test with SPECT April 2022: Small anterior scar versus attenuation.  No ischemia.\par \par Labs March 2023: Normal FBS.  Normal LFT.  Normal creatinine.\par \par

## 2023-11-06 ENCOUNTER — APPOINTMENT (OUTPATIENT)
Dept: CARDIOLOGY | Facility: CLINIC | Age: 73
End: 2023-11-06
Payer: COMMERCIAL

## 2023-11-06 VITALS
OXYGEN SATURATION: 96 % | HEIGHT: 62 IN | HEART RATE: 76 BPM | BODY MASS INDEX: 37.54 KG/M2 | SYSTOLIC BLOOD PRESSURE: 140 MMHG | DIASTOLIC BLOOD PRESSURE: 84 MMHG | WEIGHT: 204 LBS

## 2023-11-06 VITALS — SYSTOLIC BLOOD PRESSURE: 140 MMHG | DIASTOLIC BLOOD PRESSURE: 76 MMHG

## 2023-11-06 PROCEDURE — 99214 OFFICE O/P EST MOD 30 MIN: CPT

## 2023-11-06 PROCEDURE — 93308 TTE F-UP OR LMTD: CPT

## 2023-11-15 ENCOUNTER — RX CHANGE (OUTPATIENT)
Age: 73
End: 2023-11-15

## 2024-03-16 ENCOUNTER — RX RENEWAL (OUTPATIENT)
Age: 74
End: 2024-03-16

## 2024-03-18 RX ORDER — TRIAMTERENE AND HYDROCHLOROTHIAZIDE 37.5; 25 MG/1; MG/1
37.5-25 CAPSULE ORAL
Qty: 90 | Refills: 3 | Status: ACTIVE | COMMUNITY
Start: 1900-01-01 | End: 1900-01-01

## 2024-06-28 ENCOUNTER — APPOINTMENT (OUTPATIENT)
Dept: CARDIOLOGY | Facility: CLINIC | Age: 74
End: 2024-06-28
Payer: COMMERCIAL

## 2024-06-28 VITALS
WEIGHT: 220 LBS | SYSTOLIC BLOOD PRESSURE: 146 MMHG | RESPIRATION RATE: 14 BRPM | OXYGEN SATURATION: 98 % | HEIGHT: 62 IN | HEART RATE: 90 BPM | BODY MASS INDEX: 40.48 KG/M2 | DIASTOLIC BLOOD PRESSURE: 82 MMHG

## 2024-06-28 DIAGNOSIS — E04.1 NONTOXIC SINGLE THYROID NODULE: ICD-10-CM

## 2024-06-28 DIAGNOSIS — R06.09 OTHER FORMS OF DYSPNEA: ICD-10-CM

## 2024-06-28 DIAGNOSIS — I42.9 CARDIOMYOPATHY, UNSPECIFIED: ICD-10-CM

## 2024-06-28 DIAGNOSIS — R73.9 HYPERGLYCEMIA, UNSPECIFIED: ICD-10-CM

## 2024-06-28 DIAGNOSIS — I49.3 VENTRICULAR PREMATURE DEPOLARIZATION: ICD-10-CM

## 2024-06-28 DIAGNOSIS — E66.9 OBESITY, UNSPECIFIED: ICD-10-CM

## 2024-06-28 DIAGNOSIS — E78.5 HYPERLIPIDEMIA, UNSPECIFIED: ICD-10-CM

## 2024-06-28 DIAGNOSIS — R73.02 IMPAIRED GLUCOSE TOLERANCE (ORAL): ICD-10-CM

## 2024-06-28 PROCEDURE — G2211 COMPLEX E/M VISIT ADD ON: CPT

## 2024-06-28 PROCEDURE — 99214 OFFICE O/P EST MOD 30 MIN: CPT

## 2024-07-22 ENCOUNTER — APPOINTMENT (OUTPATIENT)
Dept: CARDIOLOGY | Facility: CLINIC | Age: 74
End: 2024-07-22

## 2024-09-13 ENCOUNTER — APPOINTMENT (OUTPATIENT)
Dept: CARDIOLOGY | Facility: CLINIC | Age: 74
End: 2024-09-13
Payer: COMMERCIAL

## 2024-09-13 PROCEDURE — 93306 TTE W/DOPPLER COMPLETE: CPT

## 2024-12-20 ENCOUNTER — APPOINTMENT (OUTPATIENT)
Dept: CARDIOLOGY | Facility: CLINIC | Age: 74
End: 2024-12-20
Payer: COMMERCIAL

## 2024-12-20 VITALS
HEIGHT: 62 IN | DIASTOLIC BLOOD PRESSURE: 72 MMHG | RESPIRATION RATE: 14 BRPM | BODY MASS INDEX: 40.67 KG/M2 | OXYGEN SATURATION: 95 % | SYSTOLIC BLOOD PRESSURE: 126 MMHG | WEIGHT: 221 LBS | HEART RATE: 76 BPM

## 2024-12-20 DIAGNOSIS — R06.09 OTHER FORMS OF DYSPNEA: ICD-10-CM

## 2024-12-20 DIAGNOSIS — E66.9 OBESITY, UNSPECIFIED: ICD-10-CM

## 2024-12-20 DIAGNOSIS — I49.3 VENTRICULAR PREMATURE DEPOLARIZATION: ICD-10-CM

## 2024-12-20 DIAGNOSIS — I42.9 CARDIOMYOPATHY, UNSPECIFIED: ICD-10-CM

## 2024-12-20 DIAGNOSIS — E78.5 HYPERLIPIDEMIA, UNSPECIFIED: ICD-10-CM

## 2024-12-20 DIAGNOSIS — R73.9 HYPERGLYCEMIA, UNSPECIFIED: ICD-10-CM

## 2024-12-20 DIAGNOSIS — R73.02 IMPAIRED GLUCOSE TOLERANCE (ORAL): ICD-10-CM

## 2024-12-20 PROCEDURE — 99214 OFFICE O/P EST MOD 30 MIN: CPT

## 2024-12-20 PROCEDURE — G2211 COMPLEX E/M VISIT ADD ON: CPT | Mod: NC

## 2024-12-20 RX ORDER — TIRZEPATIDE 5 MG/.5ML
5 INJECTION, SOLUTION SUBCUTANEOUS
Qty: 1 | Refills: 1 | Status: ACTIVE | COMMUNITY
Start: 2024-12-20 | End: 1900-01-01

## 2024-12-20 RX ORDER — ANASTROZOLE TABLETS 1 MG/1
1 TABLET ORAL DAILY
Refills: 0 | Status: ACTIVE | COMMUNITY

## 2024-12-21 ENCOUNTER — RX RENEWAL (OUTPATIENT)
Age: 74
End: 2024-12-21

## 2025-06-19 ENCOUNTER — RESULT CHARGE (OUTPATIENT)
Age: 75
End: 2025-06-19

## 2025-06-19 ENCOUNTER — APPOINTMENT (OUTPATIENT)
Dept: CARDIOLOGY | Facility: CLINIC | Age: 75
End: 2025-06-19
Payer: MEDICARE

## 2025-06-19 VITALS
SYSTOLIC BLOOD PRESSURE: 120 MMHG | WEIGHT: 220 LBS | HEART RATE: 86 BPM | DIASTOLIC BLOOD PRESSURE: 72 MMHG | HEIGHT: 62 IN | OXYGEN SATURATION: 96 % | RESPIRATION RATE: 14 BRPM | BODY MASS INDEX: 40.48 KG/M2

## 2025-06-19 PROCEDURE — 93000 ELECTROCARDIOGRAM COMPLETE: CPT

## 2025-06-19 PROCEDURE — 99204 OFFICE O/P NEW MOD 45 MIN: CPT

## 2025-06-19 PROCEDURE — G2211 COMPLEX E/M VISIT ADD ON: CPT

## 2025-06-20 ENCOUNTER — RX RENEWAL (OUTPATIENT)
Age: 75
End: 2025-06-20